# Patient Record
Sex: FEMALE | Race: WHITE | Employment: PART TIME | URBAN - METROPOLITAN AREA
[De-identification: names, ages, dates, MRNs, and addresses within clinical notes are randomized per-mention and may not be internally consistent; named-entity substitution may affect disease eponyms.]

---

## 2017-05-19 ENCOUNTER — OFFICE VISIT (OUTPATIENT)
Dept: FAMILY MEDICINE CLINIC | Facility: CLINIC | Age: 52
End: 2017-05-19

## 2017-05-19 VITALS
WEIGHT: 174.38 LBS | DIASTOLIC BLOOD PRESSURE: 68 MMHG | RESPIRATION RATE: 16 BRPM | HEART RATE: 74 BPM | HEIGHT: 67 IN | TEMPERATURE: 98 F | SYSTOLIC BLOOD PRESSURE: 112 MMHG | BODY MASS INDEX: 27.37 KG/M2

## 2017-05-19 DIAGNOSIS — R53.82 CHRONIC FATIGUE: ICD-10-CM

## 2017-05-19 DIAGNOSIS — K29.00 ACUTE GASTRITIS WITHOUT HEMORRHAGE, UNSPECIFIED GASTRITIS TYPE: ICD-10-CM

## 2017-05-19 DIAGNOSIS — Z00.00 HEALTH CARE MAINTENANCE: Primary | ICD-10-CM

## 2017-05-19 DIAGNOSIS — E03.3 POSTINFECTIOUS HYPOTHYROIDISM: ICD-10-CM

## 2017-05-19 PROCEDURE — 99396 PREV VISIT EST AGE 40-64: CPT | Performed by: FAMILY MEDICINE

## 2017-05-19 RX ORDER — IBUPROFEN 400 MG/1
400 TABLET ORAL EVERY 6 HOURS PRN
COMMUNITY

## 2017-05-19 RX ORDER — MULTIVIT WITH MINERALS/LUTEIN
1000 TABLET ORAL DAILY
COMMUNITY
End: 2017-05-19

## 2017-05-19 RX ORDER — CHOLECALCIFEROL (VITAMIN D3) 125 MCG
1 CAPSULE ORAL AS NEEDED
COMMUNITY
End: 2018-04-30 | Stop reason: ALTCHOICE

## 2017-05-19 RX ORDER — GLUCOSAMINE HCL 500 MG
1 TABLET ORAL DAILY
COMMUNITY
End: 2018-04-30

## 2017-05-19 NOTE — PROGRESS NOTES
[facility] [place]  PROGRESS NOTE  Chief Complaint:   Patient presents with:  Physical: complaing of low grade heartburn      HPI:   This is a 46year old female coming in for health care check   Has been going to dr. Apolinar Orozco.    So vomiting, constipation, diarrhea, or blood in stool. : denies dysuria, no urinary frequency , no discharge. MUSCULOSKELETAL:  Denies weakness, muscle aches, back pain, joint pain, swelling or stiffness.   NEUROLOGICAL:  Denies headache, , dizziness, syn AND PLAN:   1. Health care maintenance    - CBC With Differential With Platelet; Future  - Comp Metabolic Panel (14) [E]; Future  - UA/M With Culture Reflex [E]; Future  - TSH W Reflex To Free T4 [E]; Future  - Lipid Panel [E]; Future    2.  Chronic fatigue

## 2017-05-19 NOTE — PATIENT INSTRUCTIONS
Good exam today     Obtain fasting labs in near future. Increase dosing of zantac to twice daily for 1 month.    Call next week if this is not helping     Recheck in 1 y

## 2018-04-30 ENCOUNTER — OFFICE VISIT (OUTPATIENT)
Dept: FAMILY MEDICINE CLINIC | Facility: CLINIC | Age: 53
End: 2018-04-30

## 2018-04-30 VITALS
HEIGHT: 67 IN | BODY MASS INDEX: 27.65 KG/M2 | TEMPERATURE: 98 F | OXYGEN SATURATION: 97 % | DIASTOLIC BLOOD PRESSURE: 68 MMHG | SYSTOLIC BLOOD PRESSURE: 114 MMHG | RESPIRATION RATE: 16 BRPM | WEIGHT: 176.19 LBS | HEART RATE: 86 BPM

## 2018-04-30 DIAGNOSIS — J30.2 SEASONAL ALLERGIC RHINITIS, UNSPECIFIED TRIGGER: Primary | ICD-10-CM

## 2018-04-30 PROCEDURE — 99213 OFFICE O/P EST LOW 20 MIN: CPT | Performed by: FAMILY MEDICINE

## 2018-04-30 RX ORDER — CETIRIZINE HYDROCHLORIDE 10 MG/1
10 TABLET ORAL DAILY
COMMUNITY

## 2018-04-30 RX ORDER — RANITIDINE 150 MG/1
150 CAPSULE ORAL 2 TIMES DAILY
COMMUNITY
End: 2020-08-22

## 2018-04-30 RX ORDER — MULTIVITAMIN
1 TABLET ORAL DAILY
COMMUNITY

## 2018-04-30 NOTE — PATIENT INSTRUCTIONS
Change zyrtec to allegra or claritin  Daily for 2 -3 months. flonase 2 sprays each nare twice daily for 1 -2 weeks. Ibuprofen 2 tabs three times a day for 2 -5 days.

## 2018-04-30 NOTE — PROGRESS NOTES
Chief Complaint:   Patient presents with:  Cough  Nasal Congestion  Chest Congestion  Other: Blood blister in the vaginal area      HPI:   This is a 45year old female coming in for nasal fullness and congestion. Began late last week.     Patient with know discomfort, palpitations, edema,    RESPIRATORY:  Denies shortness of breath, wheezing, cough or sputum production. GASTROINTESTINAL:  Denies abdominal pain, nausea, vomiting, constipation, diarrhea      PSYCHIATRIC:  Denies depression or anxiety.   ENDO Medical education done. Outcome: Patient verbalizes understanding. Patient is notified to call with any questions, complications, allergies, or worsening or changing symptoms.   Patient is to call with any side effects or complications from the treatments

## 2018-06-28 ENCOUNTER — TELEPHONE (OUTPATIENT)
Dept: FAMILY MEDICINE CLINIC | Facility: CLINIC | Age: 53
End: 2018-06-28

## 2018-06-28 NOTE — TELEPHONE ENCOUNTER
Pt saw Dr. Hiren Salas 5/19/17 for a physical in which he ordered  CBC, CMP, iron and tibc, lipid panel, UA w/ reflex, and vitamin d 25-hydroxy. Letter sent 7/24/17.      Left message for pt stating she was overdue for labs and physical and to return the call to sc

## 2018-07-13 NOTE — TELEPHONE ENCOUNTER
Pt has not responded to several attempts to schedule blood work and physical with Dr. Everton Bains since 7/24/17. Ok to cancel these orders?

## 2018-08-14 PROBLEM — M85.80 OSTEOPENIA: Status: ACTIVE | Noted: 2017-07-27

## 2018-08-14 PROBLEM — N95.1 PERIMENOPAUSAL: Status: ACTIVE | Noted: 2017-07-27

## 2018-08-14 PROBLEM — E03.9 ACQUIRED HYPOTHYROIDISM: Status: ACTIVE | Noted: 2017-07-27

## 2018-08-14 PROBLEM — N95.0 POST-MENOPAUSAL BLEEDING: Status: ACTIVE | Noted: 2018-02-22

## 2018-08-14 PROBLEM — N95.0 POST-MENOPAUSAL BLEEDING: Status: RESOLVED | Noted: 2018-02-22 | Resolved: 2018-08-14

## 2018-08-14 PROCEDURE — 88175 CYTOPATH C/V AUTO FLUID REDO: CPT | Performed by: OBSTETRICS & GYNECOLOGY

## 2018-09-12 ENCOUNTER — APPOINTMENT (OUTPATIENT)
Dept: LAB | Age: 53
End: 2018-09-12
Attending: FAMILY MEDICINE
Payer: COMMERCIAL

## 2018-09-12 ENCOUNTER — OFFICE VISIT (OUTPATIENT)
Dept: FAMILY MEDICINE CLINIC | Facility: CLINIC | Age: 53
End: 2018-09-12
Payer: COMMERCIAL

## 2018-09-12 VITALS
DIASTOLIC BLOOD PRESSURE: 70 MMHG | TEMPERATURE: 98 F | BODY MASS INDEX: 27.47 KG/M2 | HEART RATE: 80 BPM | WEIGHT: 175 LBS | RESPIRATION RATE: 14 BRPM | HEIGHT: 67 IN | SYSTOLIC BLOOD PRESSURE: 112 MMHG

## 2018-09-12 DIAGNOSIS — Z00.00 HEALTH CARE MAINTENANCE: Primary | ICD-10-CM

## 2018-09-12 DIAGNOSIS — Z11.59 NEED FOR HEPATITIS C SCREENING TEST: ICD-10-CM

## 2018-09-12 DIAGNOSIS — E03.9 ACQUIRED HYPOTHYROIDISM: ICD-10-CM

## 2018-09-12 LAB
ALBUMIN SERPL-MCNC: 3.7 G/DL (ref 3.5–4.8)
ALBUMIN/GLOB SERPL: 0.9 {RATIO} (ref 1–2)
ALP LIVER SERPL-CCNC: 81 U/L (ref 41–108)
ALT SERPL-CCNC: 28 U/L (ref 14–54)
ANION GAP SERPL CALC-SCNC: 8 MMOL/L (ref 0–18)
AST SERPL-CCNC: 20 U/L (ref 15–41)
BASOPHILS # BLD AUTO: 0.05 X10(3) UL (ref 0–0.1)
BASOPHILS NFR BLD AUTO: 0.5 %
BILIRUB SERPL-MCNC: 0.3 MG/DL (ref 0.1–2)
BUN BLD-MCNC: 17 MG/DL (ref 8–20)
BUN/CREAT SERPL: 23 (ref 10–20)
CALCIUM BLD-MCNC: 9.2 MG/DL (ref 8.3–10.3)
CHLORIDE SERPL-SCNC: 106 MMOL/L (ref 101–111)
CHOLEST SMN-MCNC: 165 MG/DL (ref ?–200)
CO2 SERPL-SCNC: 24 MMOL/L (ref 22–32)
CREAT BLD-MCNC: 0.74 MG/DL (ref 0.55–1.02)
EOSINOPHIL # BLD AUTO: 0.15 X10(3) UL (ref 0–0.3)
EOSINOPHIL NFR BLD AUTO: 1.6 %
ERYTHROCYTE [DISTWIDTH] IN BLOOD BY AUTOMATED COUNT: 12.5 % (ref 11.5–16)
GLOBULIN PLAS-MCNC: 3.9 G/DL (ref 2.5–4)
GLUCOSE BLD-MCNC: 86 MG/DL (ref 70–99)
HCT VFR BLD AUTO: 40.8 % (ref 34–50)
HCV AB SERPL QL IA: NONREACTIVE
HDLC SERPL-MCNC: 45 MG/DL (ref 40–59)
HGB BLD-MCNC: 13.4 G/DL (ref 12–16)
IMMATURE GRANULOCYTE COUNT: 0.03 X10(3) UL (ref 0–1)
IMMATURE GRANULOCYTE RATIO %: 0.3 %
LDLC SERPL CALC-MCNC: 88 MG/DL (ref ?–100)
LYMPHOCYTES # BLD AUTO: 2.42 X10(3) UL (ref 0.9–4)
LYMPHOCYTES NFR BLD AUTO: 26.1 %
M PROTEIN MFR SERPL ELPH: 7.6 G/DL (ref 6.1–8.3)
MCH RBC QN AUTO: 30.6 PG (ref 27–33.2)
MCHC RBC AUTO-ENTMCNC: 32.8 G/DL (ref 31–37)
MCV RBC AUTO: 93.2 FL (ref 81–100)
MONOCYTES # BLD AUTO: 0.56 X10(3) UL (ref 0.1–1)
MONOCYTES NFR BLD AUTO: 6 %
NEUTROPHIL ABS PRELIM: 6.05 X10 (3) UL (ref 1.3–6.7)
NEUTROPHILS # BLD AUTO: 6.05 X10(3) UL (ref 1.3–6.7)
NEUTROPHILS NFR BLD AUTO: 65.5 %
NONHDLC SERPL-MCNC: 120 MG/DL (ref ?–130)
OSMOLALITY SERPL CALC.SUM OF ELEC: 287 MOSM/KG (ref 275–295)
PLATELET # BLD AUTO: 284 10(3)UL (ref 150–450)
POTASSIUM SERPL-SCNC: 4 MMOL/L (ref 3.6–5.1)
RBC # BLD AUTO: 4.38 X10(6)UL (ref 3.8–5.1)
RED CELL DISTRIBUTION WIDTH-SD: 42.6 FL (ref 35.1–46.3)
SODIUM SERPL-SCNC: 138 MMOL/L (ref 136–144)
TRIGL SERPL-MCNC: 160 MG/DL (ref 30–149)
TSI SER-ACNC: 2.46 MIU/ML (ref 0.35–5.5)
VLDLC SERPL CALC-MCNC: 32 MG/DL (ref 0–30)
WBC # BLD AUTO: 9.3 X10(3) UL (ref 4–13)

## 2018-09-12 PROCEDURE — 36415 COLL VENOUS BLD VENIPUNCTURE: CPT | Performed by: FAMILY MEDICINE

## 2018-09-12 PROCEDURE — 80061 LIPID PANEL: CPT | Performed by: FAMILY MEDICINE

## 2018-09-12 PROCEDURE — 80050 GENERAL HEALTH PANEL: CPT | Performed by: FAMILY MEDICINE

## 2018-09-12 PROCEDURE — 99396 PREV VISIT EST AGE 40-64: CPT | Performed by: FAMILY MEDICINE

## 2018-09-12 PROCEDURE — 86803 HEPATITIS C AB TEST: CPT | Performed by: FAMILY MEDICINE

## 2018-09-12 NOTE — PROGRESS NOTES
Chief Complaint:   Patient presents with:  Physical: sees gyne      HPI:   This is a 45year old female coming in for follow-up care. Patient sees GYN for yearly check. 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   Pa Ears, Nose, Throat:  Denies hearing loss,or disturbance. Denies sore throat  INTEGUMENTARY:  Denies rashes, itching.   CARDIOVASCULAR:   SEE HPI  Denies chest pain, chest pressure, chest discomfort, palpitations, edema, dyspnea on exertion or at rest.  RESP auscultation bilterally, no rales/rhonchi/wheezing.     ABDOMEN:  Soft, nondistended, nontender, bowel sounds normal in all 4 quadrants, no masses, no hepatosplenomegaly. BACK: No tenderness, FROM.     EXTREMITIES:  No edema, no cyanosis, no clubbing, FROM

## 2018-09-13 ENCOUNTER — TELEPHONE (OUTPATIENT)
Dept: FAMILY MEDICINE CLINIC | Facility: CLINIC | Age: 53
End: 2018-09-13

## 2018-09-13 NOTE — TELEPHONE ENCOUNTER
----- Message from Syeda Waldrop MD sent at 9/13/2018  5:23 AM CDT -----  Labs reviewed     Hepatitis C - negative     Cholesterol profile good.      Thyroid testing normal     Blood sugar , kidney function , LFTs - normal     CBC  - normal

## 2018-10-01 PROBLEM — N88.2 CERVICAL STENOSIS (UTERINE CERVIX): Status: ACTIVE | Noted: 2018-10-01

## 2018-10-01 PROBLEM — N95.0 POST-MENOPAUSAL BLEEDING: Status: ACTIVE | Noted: 2018-10-01

## 2018-10-01 PROBLEM — Z78.0 MENOPAUSE: Status: ACTIVE | Noted: 2017-07-27

## 2018-10-01 PROCEDURE — 88305 TISSUE EXAM BY PATHOLOGIST: CPT | Performed by: OBSTETRICS & GYNECOLOGY

## 2018-11-08 ENCOUNTER — OFFICE VISIT (OUTPATIENT)
Dept: FAMILY MEDICINE CLINIC | Facility: CLINIC | Age: 53
End: 2018-11-08
Payer: COMMERCIAL

## 2018-11-08 VITALS
DIASTOLIC BLOOD PRESSURE: 72 MMHG | BODY MASS INDEX: 27.47 KG/M2 | HEART RATE: 74 BPM | TEMPERATURE: 98 F | OXYGEN SATURATION: 97 % | SYSTOLIC BLOOD PRESSURE: 112 MMHG | WEIGHT: 175 LBS | HEIGHT: 67 IN | RESPIRATION RATE: 20 BRPM

## 2018-11-08 DIAGNOSIS — J02.9 SORE THROAT: Primary | ICD-10-CM

## 2018-11-08 DIAGNOSIS — J00 ACUTE NASOPHARYNGITIS: ICD-10-CM

## 2018-11-08 PROCEDURE — 87081 CULTURE SCREEN ONLY: CPT | Performed by: NURSE PRACTITIONER

## 2018-11-08 PROCEDURE — 99213 OFFICE O/P EST LOW 20 MIN: CPT | Performed by: NURSE PRACTITIONER

## 2018-11-08 PROCEDURE — 87880 STREP A ASSAY W/OPTIC: CPT | Performed by: NURSE PRACTITIONER

## 2018-11-08 RX ORDER — NAPROXEN 250 MG/1
250 TABLET ORAL 2 TIMES DAILY WITH MEALS
COMMUNITY

## 2018-11-08 NOTE — PROGRESS NOTES
CHIEF COMPLAINT:   Patient presents with:  Sore Throat: since yesterday      HPI:   Eneida John is a 45year old female who presents to clinic today with complaints of sore throat started yesterday morning (11/7), better throughout the day and got nodules  LUNGS: See HPI  CARDIOVASCULAR: No chest pain, palpitations  GI: No N/V/C/D.   NEURO: denies complaints    EXAM:   /72 (BP Location: Left arm, Patient Position: Sitting, Cuff Size: adult)   Pulse 74   Temp 97.6 °F (36.4 °C)   Resp 20   Ht 67\ AM

## 2018-11-08 NOTE — PATIENT INSTRUCTIONS
Rest, increase fluids, salt water gargles ,neti pot (use distilled water) or saline nasal spray, Advil cold and sinus (behind the counter), Zyrtec, flonase 2 sprays each nostril once a day,  Tylenol follow up if symptoms persist or increase.

## 2018-11-10 ENCOUNTER — TELEPHONE (OUTPATIENT)
Dept: FAMILY MEDICINE CLINIC | Facility: CLINIC | Age: 53
End: 2018-11-10

## 2018-11-10 NOTE — TELEPHONE ENCOUNTER
----- Message from JOELLE Ferrell sent at 11/10/2018 10:32 AM CST -----  Negative strep culture- please notify patient

## 2018-11-10 NOTE — TELEPHONE ENCOUNTER
A detailed message was left on the patient's home phone. The patient was also informed to call back with any questions or concerns.

## 2018-11-16 ENCOUNTER — LABORATORY ENCOUNTER (OUTPATIENT)
Dept: LAB | Age: 53
End: 2018-11-16
Attending: FAMILY MEDICINE
Payer: COMMERCIAL

## 2018-11-16 DIAGNOSIS — N95.1 SYMPTOMATIC MENOPAUSAL OR FEMALE CLIMACTERIC STATES: Primary | ICD-10-CM

## 2018-11-16 DIAGNOSIS — E55.9 VITAMIN D DEFICIENCY: ICD-10-CM

## 2018-11-16 PROCEDURE — 84403 ASSAY OF TOTAL TESTOSTERONE: CPT

## 2018-11-16 PROCEDURE — 82627 DEHYDROEPIANDROSTERONE: CPT

## 2018-11-16 PROCEDURE — 84144 ASSAY OF PROGESTERONE: CPT

## 2018-11-16 PROCEDURE — 84270 ASSAY OF SEX HORMONE GLOBUL: CPT

## 2018-11-16 PROCEDURE — 82679 ASSAY OF ESTRONE: CPT

## 2018-11-16 PROCEDURE — 82306 VITAMIN D 25 HYDROXY: CPT

## 2018-11-16 PROCEDURE — 36415 COLL VENOUS BLD VENIPUNCTURE: CPT

## 2018-11-16 PROCEDURE — 82670 ASSAY OF TOTAL ESTRADIOL: CPT

## 2019-07-12 ENCOUNTER — OFFICE VISIT (OUTPATIENT)
Dept: FAMILY MEDICINE CLINIC | Facility: CLINIC | Age: 54
End: 2019-07-12
Payer: COMMERCIAL

## 2019-07-12 VITALS
WEIGHT: 174 LBS | RESPIRATION RATE: 16 BRPM | BODY MASS INDEX: 27.31 KG/M2 | SYSTOLIC BLOOD PRESSURE: 110 MMHG | HEART RATE: 74 BPM | TEMPERATURE: 98 F | DIASTOLIC BLOOD PRESSURE: 84 MMHG | OXYGEN SATURATION: 98 % | HEIGHT: 67 IN

## 2019-07-12 DIAGNOSIS — J30.2 SEASONAL ALLERGIC RHINITIS, UNSPECIFIED TRIGGER: ICD-10-CM

## 2019-07-12 DIAGNOSIS — J02.9 SORE THROAT: Primary | ICD-10-CM

## 2019-07-12 PROCEDURE — 99213 OFFICE O/P EST LOW 20 MIN: CPT | Performed by: NURSE PRACTITIONER

## 2019-07-12 NOTE — PATIENT INSTRUCTIONS
Continue antihistamine. Start Flonase nasal spray. Continue tea, increasing fluids. Prop your head up at night.     Non-medication:    Rest, increase fluids, broth-based soups, salt water gargles, cool-mist humidifiers, Neti pot (only use with distilled

## 2019-07-12 NOTE — PROGRESS NOTES
CHIEF COMPLAINT:   Patient presents with:  Sore Throat: since making the appt her sore throat has gone away      HPI:   Loyd Alex is a 52year old female who presents to clinic today with complaints of sore throat.       Sore throat started Talia Sexton use: No        Alcohol/week: 0.0 oz        Comment: 1 glass of wine once in awhile      Drug use: No      Sexual activity: Yes        Partners: Male       REVIEW OF SYSTEMS:   GENERAL: feels well ,  good appetite  SKIN: no unusual skin lesions or rashes  H resolved on her own prior to today's visit. Patient with history of allergic rhinitis, reports a lot of nasal drainage and postnasal drip after stopping antihistamine last week, patient has since resumed.   Instructed patient to continue to use her oral an

## 2019-09-04 PROCEDURE — 88175 CYTOPATH C/V AUTO FLUID REDO: CPT | Performed by: OBSTETRICS & GYNECOLOGY

## 2020-08-19 ENCOUNTER — TELEPHONE (OUTPATIENT)
Dept: FAMILY MEDICINE CLINIC | Facility: CLINIC | Age: 55
End: 2020-08-19

## 2020-08-20 NOTE — TELEPHONE ENCOUNTER
This patient has not been seen by Dr. Ariana Hays in almost 2 years. She needs to come in and be seen, she is due for physical as well. I recommend she make an appt to be evaluated.  In the mean time she should elevate her legs throughout the day, minimize salt int

## 2020-08-20 NOTE — TELEPHONE ENCOUNTER
Pt states that she has had bilateral ankle swelling for the last 1.5 weeks. She states that her ankles are puffy in the morning but by the end of the day they are swollen. She states that she has not increased her usage of salt.  She states that she was

## 2020-08-21 NOTE — TELEPHONE ENCOUNTER
Patient returned call and spoke with Tammy Driscoll at front office. Tammy Driscoll advised with me and was asked to help patient get scheduled for an appointment.      Future Appointments   Date Time Provider Marcin Domínguez   8/22/2020 11:00 AM Franco Francois MD E

## 2020-08-22 ENCOUNTER — OFFICE VISIT (OUTPATIENT)
Dept: FAMILY MEDICINE CLINIC | Facility: CLINIC | Age: 55
End: 2020-08-22
Payer: COMMERCIAL

## 2020-08-22 VITALS
SYSTOLIC BLOOD PRESSURE: 118 MMHG | WEIGHT: 181.63 LBS | OXYGEN SATURATION: 98 % | TEMPERATURE: 97 F | HEIGHT: 67 IN | RESPIRATION RATE: 16 BRPM | DIASTOLIC BLOOD PRESSURE: 70 MMHG | HEART RATE: 94 BPM | BODY MASS INDEX: 28.51 KG/M2

## 2020-08-22 DIAGNOSIS — Z00.00 HEALTH CARE MAINTENANCE: Primary | ICD-10-CM

## 2020-08-22 DIAGNOSIS — R60.9 EDEMA, PERIPHERAL: ICD-10-CM

## 2020-08-22 PROBLEM — N88.2 CERVICAL STENOSIS (UTERINE CERVIX): Status: RESOLVED | Noted: 2018-10-01 | Resolved: 2020-08-22

## 2020-08-22 PROBLEM — N95.0 POST-MENOPAUSAL BLEEDING: Status: RESOLVED | Noted: 2018-10-01 | Resolved: 2020-08-22

## 2020-08-22 PROBLEM — E03.9 ACQUIRED HYPOTHYROIDISM: Status: RESOLVED | Noted: 2017-07-27 | Resolved: 2020-08-22

## 2020-08-22 PROCEDURE — 99396 PREV VISIT EST AGE 40-64: CPT | Performed by: FAMILY MEDICINE

## 2020-08-22 PROCEDURE — 3074F SYST BP LT 130 MM HG: CPT | Performed by: FAMILY MEDICINE

## 2020-08-22 PROCEDURE — 3008F BODY MASS INDEX DOCD: CPT | Performed by: FAMILY MEDICINE

## 2020-08-22 PROCEDURE — 3078F DIAST BP <80 MM HG: CPT | Performed by: FAMILY MEDICINE

## 2020-08-22 RX ORDER — PANTOPRAZOLE SODIUM 40 MG/1
40 TABLET, DELAYED RELEASE ORAL DAILY
COMMUNITY
Start: 2020-07-23

## 2020-08-22 NOTE — PATIENT INSTRUCTIONS
Good exam       Labs in near future. Discussed healthy nutrition     Stay active.        Leg elevation     Consider compression stockings in fall / winter

## 2020-08-22 NOTE — PROGRESS NOTES
Chief Complaint:   Patient presents with:  Physical      HPI:   This is a 62year old female coming in for health check     Patient with complaint of recent swelling of feet and ankles.    No recent chandes noted ofnutrition, salt intake, or activity     Bi changes required):       thyroid nodule - hashimoto's thyroiditis -       hypothyroid  - s/p surgery      HTN       asthma - mild , controlled      GERD - dr. Joycelyn Galicia             Surgical History (reviewed - no changes required):       thyroidectomy  -  anxiety.   ENDOCRINOLOGIC:  Denies cold or heat intolerance,   ALLERGIES:  Denies allergic response,    EXAM:   /70   Pulse 94   Temp 96.6 °F (35.9 °C) (Temporal)   Resp 16   Ht 67\"   Wt 181 lb 9.6 oz (82.4 kg)   SpO2 98%   BMI 28.44 kg/m²  Estimated Leg elevation     Consider compression stockings in fall / winter             Patient/Caregiver Education: Patient/Caregiver Education: There are no barriers to learning. Medical education done. Outcome: Patient verbalizes understanding.  Patient is

## 2020-08-28 ENCOUNTER — TELEPHONE (OUTPATIENT)
Dept: FAMILY MEDICINE CLINIC | Facility: CLINIC | Age: 55
End: 2020-08-28

## 2020-08-28 LAB
ABSOLUTE BASOPHILS: 33 CELLS/UL (ref 0–200)
ABSOLUTE EOSINOPHILS: 112 CELLS/UL (ref 15–500)
ABSOLUTE LYMPHOCYTES: 1815 CELLS/UL (ref 850–3900)
ABSOLUTE MONOCYTES: 442 CELLS/UL (ref 200–950)
ABSOLUTE NEUTROPHILS: 4198 CELLS/UL (ref 1500–7800)
ALBUMIN/GLOBULIN RATIO: 1.7 (CALC) (ref 1–2.5)
ALBUMIN: 4.2 G/DL (ref 3.6–5.1)
ALKALINE PHOSPHATASE: 78 U/L (ref 37–153)
ALT: 21 U/L (ref 6–29)
APPEARANCE: CLEAR
AST: 19 U/L (ref 10–35)
BASOPHILS: 0.5 %
BILIRUBIN, TOTAL: 0.6 MG/DL (ref 0.2–1.2)
BILIRUBIN: NEGATIVE
BUN: 14 MG/DL (ref 7–25)
CALCIUM: 9.1 MG/DL (ref 8.6–10.4)
CARBON DIOXIDE: 28 MMOL/L (ref 20–32)
CHLORIDE: 104 MMOL/L (ref 98–110)
COLOR: YELLOW
CREATININE: 0.82 MG/DL (ref 0.5–1.05)
EGFR IF AFRICN AM: 93 ML/MIN/1.73M2
EGFR IF NONAFRICN AM: 80 ML/MIN/1.73M2
EOSINOPHILS: 1.7 %
GLOBULIN: 2.5 G/DL (CALC) (ref 1.9–3.7)
GLUCOSE: 107 MG/DL (ref 65–99)
GLUCOSE: NEGATIVE
HEMATOCRIT: 39.2 % (ref 35–45)
HEMOGLOBIN: 13.4 G/DL (ref 11.7–15.5)
KETONES: NEGATIVE
LEUKOCYTE ESTERASE: NEGATIVE
LYMPHOCYTES: 27.5 %
MCH: 30.2 PG (ref 27–33)
MCHC: 34.2 G/DL (ref 32–36)
MCV: 88.5 FL (ref 80–100)
MONOCYTES: 6.7 %
MPV: 9.6 FL (ref 7.5–12.5)
NEUTROPHILS: 63.6 %
NITRITE: NEGATIVE
OCCULT BLOOD: NEGATIVE
PH: 7 (ref 5–8)
PLATELET COUNT: 231 THOUSAND/UL (ref 140–400)
POTASSIUM: 4.4 MMOL/L (ref 3.5–5.3)
PROTEIN, TOTAL: 6.7 G/DL (ref 6.1–8.1)
PROTEIN: NEGATIVE
RDW: 12.3 % (ref 11–15)
RED BLOOD CELL COUNT: 4.43 MILLION/UL (ref 3.8–5.1)
SODIUM: 139 MMOL/L (ref 135–146)
SPECIFIC GRAVITY: 1.02 (ref 1–1.03)
TSH W/REFLEX TO FT4: 3.12 MIU/L (ref 0.4–4.5)
WHITE BLOOD CELL COUNT: 6.6 THOUSAND/UL (ref 3.8–10.8)

## 2020-08-28 NOTE — TELEPHONE ENCOUNTER
----- Message from Augusto Jack MD sent at 8/28/2020  1:39 PM CDT -----  Labs reviewed. Thyroid function testing normal.    Chemistry profile. Blood sugar at 107. Elevated. Recommend decrease sweets and starches.   Recommend recheck in 6 to 12 mon

## 2021-03-10 ENCOUNTER — TELEPHONE (OUTPATIENT)
Dept: FAMILY MEDICINE CLINIC | Facility: CLINIC | Age: 56
End: 2021-03-10

## 2021-03-10 NOTE — TELEPHONE ENCOUNTER
[facility] records updated. [facility] ED report from 2/21/21 reviewed. Patient was not admitted and needs ER f/u. Please advise if patient should be offered ED f/u appt with another provider in the next few days. No future appointments.

## 2021-03-10 NOTE — TELEPHONE ENCOUNTER
Patient contacted and scheduled for ER f/u visit with TR at his next available. Pt did not wish to see another provider sooner as her condition is stable.      Patient reports her paperwork telling her to f/u with her PCP, but she is not sure when this was

## 2021-05-12 ENCOUNTER — TELEPHONE (OUTPATIENT)
Dept: FAMILY MEDICINE CLINIC | Facility: CLINIC | Age: 56
End: 2021-05-12

## 2021-05-12 NOTE — TELEPHONE ENCOUNTER
Has f/u from 4 broken ribs on 5/17. Pt would like to know if she needs x-rays and CT scans from sledding accident in 2/21 from [place] sent over for.

## 2021-05-17 ENCOUNTER — OFFICE VISIT (OUTPATIENT)
Dept: FAMILY MEDICINE CLINIC | Facility: CLINIC | Age: 56
End: 2021-05-17
Payer: COMMERCIAL

## 2021-05-17 ENCOUNTER — HOSPITAL ENCOUNTER (OUTPATIENT)
Dept: GENERAL RADIOLOGY | Age: 56
Discharge: HOME | End: 2021-05-17
Attending: FAMILY MEDICINE
Payer: COMMERCIAL

## 2021-05-17 VITALS
HEART RATE: 87 BPM | WEIGHT: 178 LBS | TEMPERATURE: 97 F | BODY MASS INDEX: 27.94 KG/M2 | OXYGEN SATURATION: 97 % | RESPIRATION RATE: 16 BRPM | HEIGHT: 67 IN | DIASTOLIC BLOOD PRESSURE: 78 MMHG | SYSTOLIC BLOOD PRESSURE: 114 MMHG

## 2021-05-17 DIAGNOSIS — R07.89 CHEST WALL PAIN: ICD-10-CM

## 2021-05-17 DIAGNOSIS — Z87.81 HISTORY OF RIB FRACTURE: Primary | ICD-10-CM

## 2021-05-17 DIAGNOSIS — Z87.81 HISTORY OF RIB FRACTURE: ICD-10-CM

## 2021-05-17 PROCEDURE — 3074F SYST BP LT 130 MM HG: CPT | Performed by: FAMILY MEDICINE

## 2021-05-17 PROCEDURE — 3008F BODY MASS INDEX DOCD: CPT | Performed by: FAMILY MEDICINE

## 2021-05-17 PROCEDURE — 3078F DIAST BP <80 MM HG: CPT | Performed by: FAMILY MEDICINE

## 2021-05-17 PROCEDURE — 99213 OFFICE O/P EST LOW 20 MIN: CPT | Performed by: FAMILY MEDICINE

## 2021-05-17 PROCEDURE — 71100 X-RAY EXAM RIBS UNI 2 VIEWS: CPT | Performed by: FAMILY MEDICINE

## 2021-05-17 NOTE — PROGRESS NOTES
[facility] [place]  PROGRESS NOTE  Chief Complaint:   Patient presents with:  ER F/U  Other: still sore from broken ribs      HPI:   This is a 60year old female coming in for pain in her left ribs.   She was going down a sliding hill on February PROTEIN NEGATIVE NEGATIVE    NITRITE NEGATIVE NEGATIVE    LEUKOCYTE ESTERASE NEGATIVE NEGATIVE    WBC NONE SEEN < OR = 5 /HPF    RBC NONE SEEN < OR = 2 /HPF    SQUAMOUS EPITHELIAL CELLS NONE SEEN < OR = 5 /HPF    BACTERIA NONE SEEN NONE SEEN /HPF    HYA use: Yes      Alcohol/week: 0.0 standard drinks      Comment: 1 glass of wine once in awhile    Drug use: No    Family History:  Family History   Problem Relation Age of Onset   • No Known Problems Mother    • No Known Problems Father      Allergies:  No K Denies anemia, bleeding or bruising. LYMPHATICS:  Denies enlarged nodes or history of splenectomy. PSYCHIATRIC:  Denies depression or anxiety. ENDOCRINOLOGIC:  Denies excessive sweating, cold or heat intolerance, polyuria or polydipsia.   ALLERGIES:  David Raymond activities with no restrictions. - XR RIBS, UNILATERAL (2 VIEWS), LEFT (CPT=71100); Future    2. Chest wall pain  She does still have some mild chest wall pain associated with a healing rib fractures. No new treatment recommended now.   - XR Hildred Cooks

## 2022-05-02 ENCOUNTER — TELEPHONE (OUTPATIENT)
Dept: FAMILY MEDICINE CLINIC | Facility: CLINIC | Age: 57
End: 2022-05-02

## 2022-05-02 NOTE — TELEPHONE ENCOUNTER
Pt scheduled for annual physical July 22, 2022. She states that she has had a colonoscopy in the past and does not have a preference of who she goes to. If it is ok to wait until her physical in July, she will discuss at that appointment. Pt states, \"if Dr. Corey Kearney would like me to have it completed sooner. I will get it done before my appointment. \"    Please advise.

## 2022-05-06 NOTE — TELEPHONE ENCOUNTER
[id number] Brittny Hough for referral to dr. Ken Deng     My note last year indicates patient had colonoscopy 12/23/2016 - with polyp , tubular adenoma.

## 2022-07-14 ENCOUNTER — MED REC SCAN ONLY (OUTPATIENT)
Dept: FAMILY MEDICINE CLINIC | Facility: CLINIC | Age: 57
End: 2022-07-14

## 2022-07-22 ENCOUNTER — OFFICE VISIT (OUTPATIENT)
Dept: FAMILY MEDICINE CLINIC | Facility: CLINIC | Age: 57
End: 2022-07-22
Payer: COMMERCIAL

## 2022-07-22 VITALS
WEIGHT: 177.81 LBS | BODY MASS INDEX: 27.91 KG/M2 | TEMPERATURE: 98 F | DIASTOLIC BLOOD PRESSURE: 68 MMHG | OXYGEN SATURATION: 96 % | RESPIRATION RATE: 16 BRPM | HEIGHT: 67 IN | HEART RATE: 77 BPM | SYSTOLIC BLOOD PRESSURE: 122 MMHG

## 2022-07-22 DIAGNOSIS — Z00.00 HEALTH CARE MAINTENANCE: Primary | ICD-10-CM

## 2022-07-22 DIAGNOSIS — E55.9 VITAMIN D DEFICIENCY: ICD-10-CM

## 2022-07-22 PROBLEM — Z87.81 HISTORY OF RIB FRACTURE: Status: RESOLVED | Noted: 2021-05-17 | Resolved: 2022-07-22

## 2022-07-22 PROCEDURE — 3078F DIAST BP <80 MM HG: CPT | Performed by: FAMILY MEDICINE

## 2022-07-22 PROCEDURE — 3008F BODY MASS INDEX DOCD: CPT | Performed by: FAMILY MEDICINE

## 2022-07-22 PROCEDURE — 99396 PREV VISIT EST AGE 40-64: CPT | Performed by: FAMILY MEDICINE

## 2022-07-22 PROCEDURE — 3074F SYST BP LT 130 MM HG: CPT | Performed by: FAMILY MEDICINE

## 2022-09-12 ENCOUNTER — TELEPHONE (OUTPATIENT)
Dept: FAMILY MEDICINE CLINIC | Facility: CLINIC | Age: 57
End: 2022-09-12

## 2022-09-12 NOTE — TELEPHONE ENCOUNTER
LM for patient to return call to discuss whether there has been a hepatitis C exposure. Also, patient has open lab orders from July waiting for draw.

## 2022-09-24 LAB
ABSOLUTE BASOPHILS: 38 CELLS/UL (ref 0–200)
ABSOLUTE EOSINOPHILS: 167 CELLS/UL (ref 15–500)
ABSOLUTE LYMPHOCYTES: 2014 CELLS/UL (ref 850–3900)
ABSOLUTE MONOCYTES: 441 CELLS/UL (ref 200–950)
ABSOLUTE NEUTROPHILS: 4940 CELLS/UL (ref 1500–7800)
ALBUMIN/GLOBULIN RATIO: 1.5 (CALC) (ref 1–2.5)
ALBUMIN: 4.3 G/DL (ref 3.6–5.1)
ALKALINE PHOSPHATASE: 93 U/L (ref 37–153)
ALT: 18 U/L (ref 6–29)
APPEARANCE: CLEAR
AST: 18 U/L (ref 10–35)
BASOPHILS: 0.5 %
BILIRUBIN, TOTAL: 0.5 MG/DL (ref 0.2–1.2)
BILIRUBIN: NEGATIVE
BUN: 16 MG/DL (ref 7–25)
CALCIUM: 9.5 MG/DL (ref 8.6–10.4)
CARBON DIOXIDE: 27 MMOL/L (ref 20–32)
CHLORIDE: 105 MMOL/L (ref 98–110)
CHOL/HDLC RATIO: 5 (CALC)
CHOLESTEROL, TOTAL: 201 MG/DL
COLOR: YELLOW
CREATININE: 0.82 MG/DL (ref 0.5–1.03)
EGFR: 83 ML/MIN/1.73M2
EOSINOPHILS: 2.2 %
GLOBULIN: 2.8 G/DL (CALC) (ref 1.9–3.7)
GLUCOSE: 124 MG/DL (ref 65–99)
GLUCOSE: NEGATIVE
HDL CHOLESTEROL: 40 MG/DL
HEMATOCRIT: 41.5 % (ref 35–45)
HEMOGLOBIN: 14.2 G/DL (ref 11.7–15.5)
KETONES: NEGATIVE
LDL-CHOLESTEROL: 125 MG/DL (CALC)
LYMPHOCYTES: 26.5 %
MCH: 30.3 PG (ref 27–33)
MCHC: 34.2 G/DL (ref 32–36)
MCV: 88.5 FL (ref 80–100)
MONOCYTES: 5.8 %
MPV: 9.9 FL (ref 7.5–12.5)
NEUTROPHILS: 65 %
NITRITE: NEGATIVE
NON-HDL CHOLESTEROL: 161 MG/DL (CALC)
OCCULT BLOOD: NEGATIVE
PH: 6.5 (ref 5–8)
PLATELET COUNT: 267 THOUSAND/UL (ref 140–400)
POTASSIUM: 4.8 MMOL/L (ref 3.5–5.3)
PROTEIN, TOTAL: 7.1 G/DL (ref 6.1–8.1)
PROTEIN: NEGATIVE
RDW: 12.1 % (ref 11–15)
RED BLOOD CELL COUNT: 4.69 MILLION/UL (ref 3.8–5.1)
SODIUM: 139 MMOL/L (ref 135–146)
SPECIFIC GRAVITY: 1.01 (ref 1–1.03)
TRIGLYCERIDES: 215 MG/DL
TSH: 2.89 MIU/L (ref 0.4–4.5)
VITAMIN D, 25-OH, TOTAL: 84 NG/ML (ref 30–100)
WHITE BLOOD CELL COUNT: 7.6 THOUSAND/UL (ref 3.8–10.8)

## 2022-10-04 ENCOUNTER — TELEPHONE (OUTPATIENT)
Dept: FAMILY MEDICINE CLINIC | Facility: CLINIC | Age: 57
End: 2022-10-04

## 2022-10-04 NOTE — TELEPHONE ENCOUNTER
Patient had episode 9/30 of sitting for a lengthy period of time while wearing constricting boots. Since then has swelling to both feet and lower legs. Was red, but that has cleared up. Swelling remains. Denies pain. Has tried elevation with some improvement. Scheduled first available appointment for patient, 10/5.     Future Appointments   Date Time Provider Marcin Domínguez   10/5/2022 10:30 AM Trina Shoulders, APRN [facility] [place] [facility] [place]

## 2022-10-05 ENCOUNTER — OFFICE VISIT (OUTPATIENT)
Dept: FAMILY MEDICINE CLINIC | Facility: CLINIC | Age: 57
End: 2022-10-05
Payer: COMMERCIAL

## 2022-10-05 VITALS
HEART RATE: 82 BPM | DIASTOLIC BLOOD PRESSURE: 72 MMHG | OXYGEN SATURATION: 97 % | WEIGHT: 178 LBS | TEMPERATURE: 97 F | BODY MASS INDEX: 27.94 KG/M2 | SYSTOLIC BLOOD PRESSURE: 104 MMHG | HEIGHT: 67 IN | RESPIRATION RATE: 16 BRPM

## 2022-10-05 DIAGNOSIS — R60.0 BILATERAL LOWER EXTREMITY EDEMA: Primary | ICD-10-CM

## 2022-10-05 PROCEDURE — 3008F BODY MASS INDEX DOCD: CPT | Performed by: NURSE PRACTITIONER

## 2022-10-05 PROCEDURE — 3078F DIAST BP <80 MM HG: CPT | Performed by: NURSE PRACTITIONER

## 2022-10-05 PROCEDURE — 99213 OFFICE O/P EST LOW 20 MIN: CPT | Performed by: NURSE PRACTITIONER

## 2022-10-05 PROCEDURE — 3074F SYST BP LT 130 MM HG: CPT | Performed by: NURSE PRACTITIONER

## 2022-10-05 NOTE — PATIENT INSTRUCTIONS
Monitor symptoms.   Limit anti-inflammatory medications at this time (aleve, advil, naproxen, ibuprofen)  Limit sodium containing foods and drinks  Low sodium foods  Keep legs elevated 4 times a day for 10-15 minutes at a time  Wear non-constricting pants and shoes  Notify if no significant improvement or worsening

## 2022-10-26 ENCOUNTER — TELEPHONE (OUTPATIENT)
Dept: FAMILY MEDICINE CLINIC | Facility: CLINIC | Age: 57
End: 2022-10-26

## 2022-10-26 NOTE — TELEPHONE ENCOUNTER
Called pt to discuss unviewed lab results per notification received. Pt stated she did see her results and wanted to know if Hep C lab test was added as requested? Pt stated she is in the age group that testing is recommended for and does not want to be a carrier of it unknowingly. Please review and advise if ok to add Hep C to next lab draw?

## 2022-10-27 NOTE — TELEPHONE ENCOUNTER
Advised pt no need to be tested again unless has lifestyle that would put at high risk. Pt denied high risk lifestyle.

## 2024-04-17 LAB
ABSOLUTE BASOPHILS: 41 CELLS/UL (ref 0–200)
ABSOLUTE EOSINOPHILS: 271 CELLS/UL (ref 15–500)
ABSOLUTE LYMPHOCYTES: 1658 CELLS/UL (ref 850–3900)
ABSOLUTE MONOCYTES: 419 CELLS/UL (ref 200–950)
ABSOLUTE NEUTROPHILS: 3511 CELLS/UL (ref 1500–7800)
ALBUMIN/GLOBULIN RATIO: 1.5 (CALC) (ref 1–2.5)
ALBUMIN: 4 G/DL (ref 3.6–5.1)
ALKALINE PHOSPHATASE: 77 U/L (ref 37–153)
ALT: 17 U/L (ref 6–29)
APPEARANCE: CLEAR
AST: 17 U/L (ref 10–35)
BASOPHILS: 0.7 %
BILIRUBIN, TOTAL: 0.5 MG/DL (ref 0.2–1.2)
BILIRUBIN: NEGATIVE
BUN: 11 MG/DL (ref 7–25)
CALCIUM: 8.9 MG/DL (ref 8.6–10.4)
CARBON DIOXIDE: 26 MMOL/L (ref 20–32)
CHLORIDE: 105 MMOL/L (ref 98–110)
CHOL/HDLC RATIO: 4.7 (CALC)
CHOLESTEROL, TOTAL: 180 MG/DL
COLOR: YELLOW
CREATININE: 0.71 MG/DL (ref 0.5–1.05)
EGFR: 97 ML/MIN/1.73M2
EOSINOPHILS: 4.6 %
GLOBULIN: 2.6 G/DL (CALC) (ref 1.9–3.7)
GLUCOSE: 128 MG/DL (ref 65–99)
GLUCOSE: NEGATIVE
HDL CHOLESTEROL: 38 MG/DL
HEMATOCRIT: 40.8 % (ref 35–45)
HEMOGLOBIN: 14 G/DL (ref 11.7–15.5)
KETONES: NEGATIVE
LDL-CHOLESTEROL: 114 MG/DL (CALC)
LEUKOCYTE ESTERASE: NEGATIVE
LYMPHOCYTES: 28.1 %
MCH: 30.9 PG (ref 27–33)
MCHC: 34.3 G/DL (ref 32–36)
MCV: 90.1 FL (ref 80–100)
MONOCYTES: 7.1 %
MPV: 9.5 FL (ref 7.5–12.5)
NEUTROPHILS: 59.5 %
NITRITE: NEGATIVE
NON-HDL CHOLESTEROL: 142 MG/DL (CALC)
OCCULT BLOOD: NEGATIVE
PH: 6.5 (ref 5–8)
PLATELET COUNT: 262 THOUSAND/UL (ref 140–400)
POTASSIUM: 4.3 MMOL/L (ref 3.5–5.3)
PROTEIN, TOTAL: 6.6 G/DL (ref 6.1–8.1)
PROTEIN: NEGATIVE
RDW: 12.2 % (ref 11–15)
RED BLOOD CELL COUNT: 4.53 MILLION/UL (ref 3.8–5.1)
SODIUM: 138 MMOL/L (ref 135–146)
SPECIFIC GRAVITY: 1.01 (ref 1–1.03)
TRIGLYCERIDES: 162 MG/DL
TSH W/REFLEX TO FT4: 2.8 MIU/L (ref 0.4–4.5)
VITAMIN D, 25-OH, TOTAL: 82 NG/ML (ref 30–100)
WHITE BLOOD CELL COUNT: 5.9 THOUSAND/UL (ref 3.8–10.8)

## (undated) DIAGNOSIS — D12.6 TUBULAR ADENOMA OF COLON: Primary | ICD-10-CM

## (undated) NOTE — LETTER
07/24/17        Eneida Monsivais 89      Dear Bruce Norton records indicate that you have outstanding lab work and or testing that was ordered for you and has not yet been completed:          CBC [facility]

## (undated) NOTE — MR AVS SNAPSHOT
[address]  [place] [address] [id number] [phone]               Thank you for choosing us for your health care visit with Jose Lawler MD.  We are glad to serve you and happy to provide you with this summary of Increase dosing of zantac to twice daily for 1 month.    Call next week if this is not helping     Recheck in 1 y       Allergies as of May 19, 2017     No Known Allergies                 Today's Vital Signs     BP Pulse Temp Height Weight BMI    112/68 mmH Don’t eat while distracted and slow down. Avoid over sized portions. Don’t eat while when you’re bored.      EAT THESE FOODS MORE OFTEN: EAT THESE FOODS LESS OFTEN:   Make half your plate fruits and vegetables Highly refined, white starches including wh